# Patient Record
(demographics unavailable — no encounter records)

---

## 2024-10-08 NOTE — END OF VISIT
[FreeTextEntry3] : Written by Clarke Seymour NP, acting as a scribe for Dr. Phillisp The documentation recorded by the scribe accurately reflects the service I personally performed and the decisions made by me. Signature Jett Phillips MD.

## 2024-10-08 NOTE — ASSESSMENT
[FreeTextEntry1] : 74-year-old male with a history of Hydrocephalus, HTN, T2DM, DDD, depression, BPH, Asthma, presenting after multiple falls at home, Parkinson's and chronic hip pain. Pt underwent NST on 7/30/24 which showed large sized moderate in the anteroseptal, inferoseptal, inferolateral and apical walls, LVEF 63%. Pt was referred for Cardiac cath by Dr. Robles Graham.  Pre op CT head showed no acute intracranial hemorrhage, mass effect, or midline shift. Stable ventriculomegaly, disproportionate to sulcal prominence. Correlate for normal pressure hydrocephalus."  S/p diagnostic [facility] 08/14, remarkable for TVD Severe disease of LAD, Cx, and RCA. For which CTS Dr. Jett Phillips consulted for CABG evaluation.  Hospital Course: Pt and family still deciding if they want surgery, endocrine consulted for hyperglycemia,  C3L CALOS Clip on 8/22/24 1 UPRBC, TTE for elevated CVP, PETEY and hypoxia--EF 63%, mild MR, no effusion, failed Trial of void, wong reinserted on flomax, [facility] derm R chronic hip pain, DC [facility]. 8/29.  Was DC from [facility] 9/22.  Presents today with wife and daughter.  Reports doing better compared to DC. Still has some MSI pain though reports it is improving,, has not checked his weight but reports no swelling or SOB. Follows Dr. Graham for cardiology, has not seen him yet. Not walking much due to chronic hp pain since 2020, had seen ortho in past but not following anyone for it recently.  Followed at [facility] for his Parkinsons. Eating and drinking with +BM. Denies chest pain, SOB, swelling, weight gain, palpitations, cough, fever or chills. Seen by [facility] NP 2 weeks ago who stopped diuretics with repeat BMP with normal electrolytes.   Today on exam patient's lungs clear bilaterally, normal sinus rhythm, sternum stable, incision clean, dry and intact. Left LE SVG site is clean, dry and intact. Trace peripheral edema noted. I  Plan:  - Continue current medication regimen off diuretics - Follow up with cardiologist Dr. Graham - Follow up with PCP  - Referred to [facility] for chronic hip pain - Continue to walk and increase as tolerated, IS 10X hour while awake - Tight glucose control discussed in detail for wound healing - Return to office PRN - Call with any questions or concerns

## 2024-10-08 NOTE — END OF VISIT
[FreeTextEntry3] : Written by Clarke Seymour NP, acting as a scribe for Dr. Phillips The documentation recorded by the scribe accurately reflects the service I personally performed and the decisions made by me. Signature Jett Phillips MD.

## 2024-10-08 NOTE — PHYSICAL EXAM
[] : no respiratory distress [Respiration, Rhythm And Depth] : normal respiratory rhythm and effort [Exaggerated Use Of Accessory Muscles For Inspiration] : no accessory muscle use [Auscultation Breath Sounds / Voice Sounds] : lungs were clear to auscultation bilaterally [Apical Impulse] : the apical impulse was normal [Heart Rate And Rhythm] : heart rate was normal and rhythm regular [Heart Sounds] : normal S1 and S2 [Murmurs] : no murmurs [Clean] : clean [Dry] : dry [Healing Well] : healing well [No Edema] : no edema [Bleeding] : no active bleeding [Foul Odor] : no foul smell [Purulent Drainage] : no purulent drainage [Serosanguinous Drainage] : no serosanguinous drainage [Erythema] : not erythematous [Warm] : not warm [Tender] : not tender [FreeTextEntry5] : Left LE SVG site

## 2024-10-08 NOTE — ASSESSMENT
[FreeTextEntry1] : 74-year-old male with a history of Hydrocephalus, HTN, T2DM, DDD, depression, BPH, Asthma, presenting after multiple falls at home, Parkinson's and chronic hip pain. Pt underwent NST on 7/30/24 which showed large sized moderate in the anteroseptal, inferoseptal, inferolateral and apical walls, LVEF 63%. Pt was referred for Cardiac cath by Dr. Robles Graham.  Pre op CT head showed no acute intracranial hemorrhage, mass effect, or midline shift. Stable ventriculomegaly, disproportionate to sulcal prominence. Correlate for normal pressure hydrocephalus."  S/p diagnostic [facility] 08/14, remarkable for TVD Severe disease of LAD, Cx, and RCA. For which CTS Dr. Jett Phillips consulted for CABG evaluation.  Hospital Course: Pt and family still deciding if they want surgery, endocrine consulted for hyperglycemia,  C3L CALOS Clip on 8/22/24 1 UPRBC, TTE for elevated CVP, PETEY and hypoxia--EF 63%, mild MR, no effusion, failed Trial of void, wong reinserted on flomax, [facility] derm R chronic hip pain, DC [facility]. 8/29.  Was DC from [facility] 9/22.  Presents today with wife and daughter.  Reports doing better compared to DC. Still has some MSI pain though reports it is improving,, has not checked his weight but reports no swelling or SOB. Follows Dr. Graham for cardiology, has not seen him yet. Not walking much due to chronic hp pain since 2020, had seen ortho in past but not following anyone for it recently.  Followed at [facility] for his Parkinsons. Eating and drinking with +BM. Denies chest pain, SOB, swelling, weight gain, palpitations, cough, fever or chills. Seen by [facility] NP 2 weeks ago who stopped diuretics with repeat BMP with normal electrolytes.   Today on exam patient's lungs clear bilaterally, normal sinus rhythm, sternum stable, incision clean, dry and intact. Left LE SVG site is clean, dry and intact. Trace peripheral edema noted. I  Plan:  - Continue current medication regimen off diuretics - Follow up with cardiologist Dr. Graham - Follow up with PCP  - Referred to [facility] for chronic hip pain - Continue to walk and increase as tolerated, IS 10X hour while awake - Tight glucose control discussed in detail for wound healing - Return to office PRN - Call with any questions or concerns

## 2024-10-08 NOTE — ASSESSMENT
[FreeTextEntry1] : 74-year-old male with a history of Hydrocephalus, HTN, T2DM, DDD, depression, BPH, Asthma, presenting after multiple falls at home, Parkinson's and chronic hip pain. Pt underwent NST on 7/30/24 which showed large sized moderate in the anteroseptal, inferoseptal, inferolateral and apical walls, LVEF 63%. Pt was referred for Cardiac cath by Dr. Robles Graham.  Pre op CT head showed no acute intracranial hemorrhage, mass effect, or midline shift. Stable ventriculomegaly, disproportionate to sulcal prominence. Correlate for normal pressure hydrocephalus."  S/p diagnostic [facility] 08/14, remarkable for TVD Severe disease of LAD, Cx, and RCA. For which CTS Dr. Jett Phillips consulted for CABG evaluation.  Hospital Course: Pt and family still deciding if they want surgery, endocrine consulted for hyperglycemia,  C3L CALOS Clip on 8/22/24 1 UPRBC, TTE for elevated CVP, PETEY and hypoxia--EF 63%, mild MR, no effusion, failed Trial of void, wong reinserted on flomax, [facility] derm R chronic hip pain, DC [facility]. 8/29.  Was DC from [facility] 9/22.  Presents today with wife and daughter.  Reports doing better compared to DC. Still has some MSI pain though reports it is improving,, has not checked his weight but reports no swelling or SOB. Follows Dr. Graham for cardiology, has not seen him yet. Not walking much due to chronic hp pain since 2020, had seen ortho in past but not following anyone for it recently.  Followed at [facility] for his Parkinsons. Eating and drinking with +BM. Denies chest pain, SOB, swelling, weight gain, palpitations, cough, fever or chills. Seen by [facility] NP 2 weeks ago who stopped diuretics with repeat BMP with normal electrolytes.   Today on exam patient's lungs clear bilaterally, normal sinus rhythm, sternum stable, incision clean, dry and intact. Left LE SVG site is clean, dry and intact. Trace peripheral edema noted. I  Plan:  - Continue current medication regimen off diuretics - Follow up with cardiologist Dr. Graham - Follow up with PCP  - Referred to [facility] for chronic hip pain - Continue to walk and increase as tolerated, IS 10X hour while awake - Tight glucose control discussed in detail for wound healing - Return to office PRN - Call with any questions or concerns